# Patient Record
Sex: FEMALE | Race: WHITE | NOT HISPANIC OR LATINO | Employment: FULL TIME | URBAN - METROPOLITAN AREA
[De-identification: names, ages, dates, MRNs, and addresses within clinical notes are randomized per-mention and may not be internally consistent; named-entity substitution may affect disease eponyms.]

---

## 2018-05-07 ENCOUNTER — OFFICE VISIT (OUTPATIENT)
Dept: FAMILY MEDICINE CLINIC | Facility: CLINIC | Age: 58
End: 2018-05-07

## 2018-05-07 VITALS
HEIGHT: 63 IN | BODY MASS INDEX: 28.17 KG/M2 | HEART RATE: 72 BPM | OXYGEN SATURATION: 96 % | WEIGHT: 159 LBS | DIASTOLIC BLOOD PRESSURE: 90 MMHG | SYSTOLIC BLOOD PRESSURE: 140 MMHG | TEMPERATURE: 98.2 F

## 2018-05-07 DIAGNOSIS — IMO0002 CHRONIC MIGRAINE: ICD-10-CM

## 2018-05-07 DIAGNOSIS — F32.4 MAJOR DEPRESSIVE DISORDER WITH SINGLE EPISODE, IN PARTIAL REMISSION (HCC): ICD-10-CM

## 2018-05-07 DIAGNOSIS — G43.109 MIGRAINE WITH AURA AND WITHOUT STATUS MIGRAINOSUS, NOT INTRACTABLE: Primary | ICD-10-CM

## 2018-05-07 DIAGNOSIS — M19.90 OSTEOARTHRITIS, UNSPECIFIED OSTEOARTHRITIS TYPE, UNSPECIFIED SITE: ICD-10-CM

## 2018-05-07 DIAGNOSIS — Z12.39 BREAST CANCER SCREENING: ICD-10-CM

## 2018-05-07 PROBLEM — R20.2 PARESTHESIAS: Status: ACTIVE | Noted: 2017-05-16

## 2018-05-07 PROBLEM — M54.12 CERVICAL RADICULOPATHY: Status: ACTIVE | Noted: 2017-10-05

## 2018-05-07 RX ORDER — VENLAFAXINE HYDROCHLORIDE 75 MG/1
CAPSULE, EXTENDED RELEASE ORAL
COMMUNITY
Start: 2018-02-15 | End: 2018-05-22 | Stop reason: SDUPTHER

## 2018-05-07 RX ORDER — GABAPENTIN 600 MG/1
1 TABLET ORAL
COMMUNITY
Start: 2017-10-20 | End: 2018-10-19

## 2018-05-07 RX ORDER — ATENOLOL 25 MG/1
TABLET ORAL
COMMUNITY
Start: 2017-12-11

## 2018-05-07 RX ORDER — TRAMADOL HYDROCHLORIDE 50 MG/1
50 TABLET ORAL EVERY 6 HOURS PRN
Qty: 90 TABLET | Refills: 0 | Status: SHIPPED | OUTPATIENT
Start: 2018-05-07 | End: 2018-05-29 | Stop reason: ALTCHOICE

## 2018-05-07 RX ORDER — TRAMADOL HYDROCHLORIDE 50 MG/1
50 TABLET ORAL EVERY 6 HOURS PRN
COMMUNITY
End: 2018-05-07 | Stop reason: SDUPTHER

## 2018-05-07 RX ORDER — TIZANIDINE HYDROCHLORIDE 4 MG/1
4 CAPSULE, GELATIN COATED ORAL 3 TIMES DAILY PRN
Qty: 90 CAPSULE | Refills: 0 | Status: SHIPPED | OUTPATIENT
Start: 2018-05-07 | End: 2018-10-19

## 2018-05-07 NOTE — PROGRESS NOTES
Assessment/Plan:  Recommend follow-up with neurologist   Franky Ramirez return to office for recheck in 6 months  Recommend annual blood testing  Script given  Also, recommend follow-up with psychiatrist or psychologist as needed  She will return to office with any worsening headaches in frequency or severity  We discussed use of prophylactic migraine medication as well  No problem-specific Assessment & Plan notes found for this encounter  Diagnoses and all orders for this visit:    Migraine with aura and without status migrainosus, not intractable  -     TiZANidine (ZANAFLEX) 4 MG capsule; Take 1 capsule (4 mg total) by mouth 3 (three) times a day as needed for muscle spasms  -     Ambulatory referral to Neurology; Future  -     CBC and differential  -     Comprehensive metabolic panel  -     TSH, 3rd generation with T4 reflex; Future  -     Lipid Panel with Direct LDL reflex    Chronic migraine  -     traMADol (ULTRAM) 50 mg tablet; Take 1 tablet (50 mg total) by mouth every 6 (six) hours as needed for moderate pain    Osteoarthritis, unspecified osteoarthritis type, unspecified site  -     CBC and differential  -     Comprehensive metabolic panel  -     TSH, 3rd generation with T4 reflex; Future  -     Lipid Panel with Direct LDL reflex    Breast cancer screening  -     Mammo screening bilateral w 3d & cad; Future    Major depressive disorder with single episode, in partial remission ([facility] [address] )    Other orders  -     venlafaxine (EFFEXOR-XR) 75 mg 24 hr capsule; take 1 capsule by mouth once daily for 3 days then 2 for 3 days then 3 daily  -     gabapentin (NEURONTIN) 600 MG tablet; Take 1 tablet by mouth  -     atenolol (TENORMIN) 25 mg tablet; take 1 tablet by mouth once daily  -     Discontinue: traMADol (ULTRAM) 50 mg tablet; Take 50 mg by mouth every 6 (six) hours as needed for moderate pain          Subjective:      Patient ID: Lorri Graves is a 65 y o  female  Patient here with multiple concerns    She has history of chronic intermittent migraines  These have been present for several years  She has approximately 3-9 per month  Migraines come and go  She has occasional photophobia and phonophobia with headaches  She notes occasional nausea as well  No preceding auras  She has history of situational depression and major depressive disorder  She has seen a therapist for this in the past and is currently on medication  Symptoms are currently stable  She has history mild-to-moderate osteoarthritis symptoms that are well controlled with anti-inflammatories when needed  She is up-to-date on colonoscopy and sees a gynecologist regularly  The following portions of the patient's history were reviewed and updated as appropriate: allergies, current medications, past family history, past medical history, past social history, past surgical history and problem list     Review of Systems   Constitutional: Negative  HENT: Negative  Eyes: Negative  Respiratory: Negative  Cardiovascular: Negative  Gastrointestinal: Negative  Endocrine: Negative  Genitourinary: Negative  Musculoskeletal: Positive for neck pain  Skin: Negative  Allergic/Immunologic: Negative  Neurological: Positive for headaches  Hematological: Negative  Psychiatric/Behavioral: Negative  Objective:      /90 (BP Location: Left arm, Patient Position: Sitting, Cuff Size: Adult)   Pulse 72   Temp 98 2 °F (36 8 °C)   Ht 5' 2 6" (1 59 m)   Wt 72 1 kg (159 lb)   SpO2 96%   BMI 28 53 kg/m²          Physical Exam   Constitutional: She is oriented to person, place, and time  She appears well-developed and well-nourished  HENT:   Head: Normocephalic and atraumatic  Right Ear: External ear normal  Tympanic membrane is not erythematous and not bulging  Left Ear: External ear normal  Tympanic membrane is not erythematous and not bulging     Nose: Nose normal    Mouth/Throat: Oropharynx is clear and moist and mucous membranes are normal  No oral lesions  No oropharyngeal exudate  Eyes: Conjunctivae and EOM are normal  Right eye exhibits no discharge  Left eye exhibits no discharge  No scleral icterus  Neck: Normal range of motion  Neck supple  No thyromegaly present  Cardiovascular: Normal rate, regular rhythm and normal heart sounds  Exam reveals no gallop and no friction rub  No murmur heard  Pulmonary/Chest: Effort normal  No respiratory distress  She has no wheezes  She has no rales  She exhibits no tenderness  Abdominal: Soft  Bowel sounds are normal  She exhibits no distension and no mass  There is no tenderness  There is no rebound and no guarding  Musculoskeletal: Normal range of motion  She exhibits no edema, tenderness or deformity  Lymphadenopathy:     She has no cervical adenopathy  Neurological: She is alert and oriented to person, place, and time  She has normal reflexes  No cranial nerve deficit  She exhibits normal muscle tone  Coordination normal    Skin: Skin is warm and dry  No rash noted  No erythema  No pallor  Psychiatric: She has a normal mood and affect  Her behavior is normal    Vitals reviewed

## 2018-05-17 ENCOUNTER — OFFICE VISIT (OUTPATIENT)
Dept: FAMILY MEDICINE CLINIC | Facility: CLINIC | Age: 58
End: 2018-05-17
Payer: OTHER GOVERNMENT

## 2018-05-17 VITALS
SYSTOLIC BLOOD PRESSURE: 144 MMHG | WEIGHT: 159 LBS | BODY MASS INDEX: 28.17 KG/M2 | DIASTOLIC BLOOD PRESSURE: 90 MMHG | TEMPERATURE: 98.4 F | HEIGHT: 63 IN

## 2018-05-17 DIAGNOSIS — Z02.89 ENCOUNTER FOR PHYSICAL EXAMINATION RELATED TO EMPLOYMENT: Primary | ICD-10-CM

## 2018-05-17 DIAGNOSIS — IMO0002 CHRONIC MIGRAINE: ICD-10-CM

## 2018-05-17 DIAGNOSIS — G43.109 MIGRAINE WITH AURA AND WITHOUT STATUS MIGRAINOSUS, NOT INTRACTABLE: ICD-10-CM

## 2018-05-17 PROCEDURE — 99214 OFFICE O/P EST MOD 30 MIN: CPT | Performed by: FAMILY MEDICINE

## 2018-05-17 RX ORDER — OXYCODONE HYDROCHLORIDE AND ACETAMINOPHEN 5; 325 MG/1; MG/1
1 TABLET ORAL EVERY 6 HOURS PRN
Qty: 20 TABLET | Refills: 0 | Status: SHIPPED | OUTPATIENT
Start: 2018-05-17 | End: 2018-05-29 | Stop reason: ALTCHOICE

## 2018-05-17 NOTE — PROGRESS NOTES
Assessment/Plan:  No significant physical findings on exam today  Recommend place PPD testing at her convenience and have it checked in 2-3 days  Form completed for  facility  See chart copy  We discussed treatment options for headaches  I gave her a very short course of Percocet to use as needed but we discussed that this should not be used as a long-term treatment option  I do recommend she follow up with her neurologist regarding migraines  No problem-specific Assessment & Plan notes found for this encounter  Diagnoses and all orders for this visit:    Encounter for physical examination related to employment    Migraine with aura and without status migrainosus, not intractable    Chronic migraine  -     oxyCODONE-acetaminophen (PERCOCET) 5-325 mg per tablet; Take 1 tablet by mouth every 6 (six) hours as needed for moderate pain (headache) Max Daily Amount: 4 tablets          Subjective:      Patient ID: Kristina Arreguin is a 65 y o  female  Patient is here for a physical for a new job at a  facility  She states she continues to have headaches  She is due to see neurologist but not until October  She is currently taking a muscle relaxant and as well as tramadol at night and still has occasional breakthrough headaches  She is requesting Percocet for headaches  She states she has allergies to nonsteroidal anti-inflammatory medications  She needs PPD skin test placed but she will not be able to come back in 2-3 days to have this read  Headache          The following portions of the patient's history were reviewed and updated as appropriate: allergies, current medications, past family history, past medical history, past social history, past surgical history and problem list     Review of Systems   Constitutional: Negative  HENT: Negative  Eyes: Negative  Respiratory: Negative  Cardiovascular: Negative  Gastrointestinal: Negative  Endocrine: Negative  Genitourinary: Negative  Musculoskeletal: Negative  Skin: Negative  Allergic/Immunologic: Negative  Neurological: Positive for headaches  Hematological: Negative  Psychiatric/Behavioral: Negative  Objective:      /90   Temp 98 4 °F (36 9 °C)   Ht 5' 2 6" (1 59 m)   Wt 72 1 kg (159 lb) Comment: pt would like to use weight from the last visit on 05/07/18  BMI 28 53 kg/m²          Physical Exam   Constitutional: She is oriented to person, place, and time  She appears well-developed and well-nourished  HENT:   Head: Normocephalic and atraumatic  Right Ear: External ear normal  Tympanic membrane is not erythematous and not bulging  Left Ear: External ear normal  Tympanic membrane is not erythematous and not bulging  Nose: Nose normal    Mouth/Throat: Oropharynx is clear and moist and mucous membranes are normal  No oral lesions  No oropharyngeal exudate  Eyes: Conjunctivae and EOM are normal  Right eye exhibits no discharge  Left eye exhibits no discharge  No scleral icterus  Neck: Normal range of motion  Neck supple  No thyromegaly present  Cardiovascular: Normal rate, regular rhythm and normal heart sounds  Exam reveals no gallop and no friction rub  No murmur heard  Pulmonary/Chest: Effort normal  No respiratory distress  She has no wheezes  She has no rales  She exhibits no tenderness  Abdominal: Soft  Bowel sounds are normal  She exhibits no distension and no mass  There is no tenderness  There is no rebound and no guarding  Musculoskeletal: Normal range of motion  She exhibits no edema, tenderness or deformity  Lymphadenopathy:     She has no cervical adenopathy  Neurological: She is alert and oriented to person, place, and time  She has normal reflexes  No cranial nerve deficit  She exhibits normal muscle tone  Coordination normal    Skin: Skin is warm and dry  No rash noted  No erythema  No pallor  Psychiatric: She has a normal mood and affect  Her behavior is normal    Vitals reviewed

## 2018-05-21 ENCOUNTER — CLINICAL SUPPORT (OUTPATIENT)
Dept: FAMILY MEDICINE CLINIC | Facility: CLINIC | Age: 58
End: 2018-05-21
Payer: OTHER GOVERNMENT

## 2018-05-21 DIAGNOSIS — Z11.1 ENCOUNTER FOR PPD TEST: Primary | ICD-10-CM

## 2018-05-21 DIAGNOSIS — IMO0002 CHRONIC MIGRAINE: ICD-10-CM

## 2018-05-21 PROCEDURE — 86580 TB INTRADERMAL TEST: CPT

## 2018-05-22 DIAGNOSIS — F32.A DEPRESSION, UNSPECIFIED DEPRESSION TYPE: Primary | ICD-10-CM

## 2018-05-22 RX ORDER — OXYCODONE HYDROCHLORIDE AND ACETAMINOPHEN 5; 325 MG/1; MG/1
1 TABLET ORAL EVERY 6 HOURS PRN
Qty: 20 TABLET | Refills: 0 | OUTPATIENT
Start: 2018-05-22

## 2018-05-23 ENCOUNTER — OFFICE VISIT (OUTPATIENT)
Dept: FAMILY MEDICINE CLINIC | Facility: CLINIC | Age: 58
End: 2018-05-23
Payer: OTHER GOVERNMENT

## 2018-05-23 ENCOUNTER — TELEPHONE (OUTPATIENT)
Dept: PAIN MEDICINE | Facility: MEDICAL CENTER | Age: 58
End: 2018-05-23

## 2018-05-23 VITALS
WEIGHT: 159 LBS | TEMPERATURE: 97.7 F | BODY MASS INDEX: 28.53 KG/M2 | SYSTOLIC BLOOD PRESSURE: 140 MMHG | DIASTOLIC BLOOD PRESSURE: 86 MMHG

## 2018-05-23 DIAGNOSIS — IMO0002 CHRONIC MIGRAINE: Primary | ICD-10-CM

## 2018-05-23 DIAGNOSIS — F32.A DEPRESSION, UNSPECIFIED DEPRESSION TYPE: ICD-10-CM

## 2018-05-23 DIAGNOSIS — F41.9 ANXIETY: ICD-10-CM

## 2018-05-23 PROCEDURE — 99214 OFFICE O/P EST MOD 30 MIN: CPT | Performed by: FAMILY MEDICINE

## 2018-05-23 RX ORDER — VENLAFAXINE HYDROCHLORIDE 75 MG/1
75 CAPSULE, EXTENDED RELEASE ORAL DAILY
Qty: 90 CAPSULE | Refills: 0 | Status: SHIPPED | OUTPATIENT
Start: 2018-05-23 | End: 2018-05-23 | Stop reason: SDUPTHER

## 2018-05-23 RX ORDER — VENLAFAXINE HYDROCHLORIDE 75 MG/1
75 CAPSULE, EXTENDED RELEASE ORAL DAILY
Qty: 90 CAPSULE | Refills: 0 | Status: SHIPPED | OUTPATIENT
Start: 2018-05-23

## 2018-05-23 RX ORDER — RIZATRIPTAN BENZOATE 10 MG/1
TABLET, ORALLY DISINTEGRATING ORAL
Qty: 9 TABLET | Refills: 2 | Status: SHIPPED | OUTPATIENT
Start: 2018-05-23 | End: 2018-10-19 | Stop reason: ALTCHOICE

## 2018-05-23 NOTE — ASSESSMENT & PLAN NOTE
No significant findings on physical exam today  We spent significant amount of time discussing her headaches and the possibility of being dependent on her current pain medication  I did recommend consideration for physical therapy or chiropractic treatment for the headache  These were deferred  We also discussed prophylactic treatment including amitriptyline  This was deferred by patient as well  Recommended consideration for prednisone but this was also refused  We will try Maxalt as needed for headaches  She does have a follow-up appointment with headache specialist in October  I did give her the card for a pain management specialist further consultation  Patient was informed we will not be providing any further prescriptions for narcotic pain medication as she is possibly becoming dependent on this and she has deferred other recommendations that I think might be better options for the headaches

## 2018-05-23 NOTE — PROGRESS NOTES
Assessment/Plan:    Chronic migraine  No significant findings on physical exam today  We spent significant amount of time discussing her headaches and the possibility of being dependent on her current pain medication  I did recommend consideration for physical therapy or chiropractic treatment for the headache  These were deferred  We also discussed prophylactic treatment including amitriptyline  This was deferred by patient as well  Recommended consideration for prednisone but this was also refused  We will try Maxalt as needed for headaches  She does have a follow-up appointment with headache specialist in October  I did give her the card for a pain management specialist further consultation  Patient was informed we will not be providing any further prescriptions for narcotic pain medication as she is possibly becoming dependent on this and she has deferred other recommendations that I think might be better options for the headaches  Major depressive disorder with single episode, in partial remission ([facility] )  Refill on Effexor was given today  Her anxiety and depression symptoms may be playing a role in chronic headaches as well  She has refused much of the recommendations for headache treatment  She is seeking follow-up with a therapist   I do agree with pursuit of this treatment option  Diagnoses and all orders for this visit:    Chronic migraine  -     rizatriptan (MAXALT-MLT) 10 MG disintegrating tablet; Take 1 tablet daily as needed for headache  May repeat one dose in 1 hour if no improvement  Anxiety    Depression, unspecified depression type  -     venlafaxine (EFFEXOR-XR) 75 mg 24 hr capsule; Take 1 capsule (75 mg total) by mouth daily          Subjective:      Patient ID: Alma Heck is a 65 y o  female  Patient is here for recheck on headaches  She had gone through Percocet rather quickly and was requesting a refill    She had used 20 Percocet in the course of a few days   She states she has been taking tramadol 2 tablets every 6 hours as needed for headaches  She continues to have frequent headaches  A she is requesting refill on Percocet again today  Patient states that headaches seem to come up the back of the neck and around to the bilateral frontal aspects of the head  She is on a beta-blocker currently and does take medication for anxiety  She admits to continued frequent crying spells and has some significant stressors at home including a child who has history of addiction  The following portions of the patient's history were reviewed and updated as appropriate: allergies, current medications, past family history, past medical history, past social history, past surgical history and problem list     Review of Systems   Constitutional: Negative  HENT: Negative  Eyes: Negative  Respiratory: Negative  Cardiovascular: Negative  Gastrointestinal: Negative  Endocrine: Negative  Genitourinary: Negative  Musculoskeletal: Negative  Skin: Negative  Allergic/Immunologic: Negative  Neurological: Positive for headaches  Hematological: Negative  Psychiatric/Behavioral: Positive for dysphoric mood  Negative for agitation, hallucinations, self-injury, sleep disturbance and suicidal ideas  The patient is nervous/anxious  Objective:      /86   Temp 97 7 °F (36 5 °C)   Wt 72 1 kg (159 lb)   BMI 28 53 kg/m²          Physical Exam   Constitutional: She is oriented to person, place, and time  She appears well-developed and well-nourished  HENT:   Head: Normocephalic and atraumatic  Right Ear: External ear normal  Tympanic membrane is not erythematous and not bulging  Left Ear: External ear normal  Tympanic membrane is not erythematous and not bulging  Nose: Nose normal    Mouth/Throat: Oropharynx is clear and moist and mucous membranes are normal  No oral lesions  No oropharyngeal exudate     Eyes: Conjunctivae and EOM are normal  Right eye exhibits no discharge  Left eye exhibits no discharge  No scleral icterus  Neck: Normal range of motion  Neck supple  No thyromegaly present  Cardiovascular: Normal rate, regular rhythm and normal heart sounds  Exam reveals no gallop and no friction rub  No murmur heard  Pulmonary/Chest: Effort normal  No respiratory distress  She has no wheezes  She has no rales  She exhibits no tenderness  Abdominal: Soft  Bowel sounds are normal  She exhibits no distension and no mass  There is no tenderness  There is no rebound and no guarding  Musculoskeletal: Normal range of motion  She exhibits no edema, tenderness or deformity  Lymphadenopathy:     She has no cervical adenopathy  Neurological: She is alert and oriented to person, place, and time  She has normal reflexes  No cranial nerve deficit  She exhibits normal muscle tone  Coordination normal    Skin: Skin is warm and dry  No rash noted  No erythema  No pallor  Psychiatric: She has a normal mood and affect  Her behavior is normal    Vitals reviewed

## 2018-05-23 NOTE — ASSESSMENT & PLAN NOTE
Refill on Effexor was given today  Her anxiety and depression symptoms may be playing a role in chronic headaches as well  She has refused much of the recommendations for headache treatment  She is seeking follow-up with a therapist   I do agree with pursuit of this treatment option

## 2018-05-29 ENCOUNTER — TELEPHONE (OUTPATIENT)
Dept: FAMILY MEDICINE CLINIC | Facility: CLINIC | Age: 58
End: 2018-05-29

## 2018-05-29 DIAGNOSIS — G43.909 MIGRAINE WITHOUT STATUS MIGRAINOSUS, NOT INTRACTABLE, UNSPECIFIED MIGRAINE TYPE: Primary | ICD-10-CM

## 2018-05-29 RX ORDER — PREDNISONE 10 MG/1
TABLET ORAL
Qty: 33 TABLET | Refills: 0 | Status: SHIPPED | OUTPATIENT
Start: 2018-05-29 | End: 2018-10-19

## 2018-05-29 NOTE — TELEPHONE ENCOUNTER
Pt called stating that she called [facility] and was told that before she can make an appt she needs a referral/order from you  They also stated that it would be several weeks before she could be seen over there after the get the referral/order  She is asking if we would refill her tramadol until she can be seen over there  Please advise  Thank you

## 2018-05-30 ENCOUNTER — TELEPHONE (OUTPATIENT)
Dept: PAIN MEDICINE | Facility: MEDICAL CENTER | Age: 58
End: 2018-05-30

## 2018-05-30 NOTE — TELEPHONE ENCOUNTER
Patient states in her previous visit it was discussed that prednisone did not work for her, She states that she want to be prescribed tramadol please advise?

## 2018-05-31 NOTE — TELEPHONE ENCOUNTER
I am recommending prednisone  It is up to her if she would like to follow our recommendation or not

## 2018-06-08 ENCOUNTER — TELEPHONE (OUTPATIENT)
Dept: FAMILY MEDICINE CLINIC | Facility: CLINIC | Age: 58
End: 2018-06-08

## 2018-06-08 NOTE — TELEPHONE ENCOUNTER
Patient called and then dropped off letter from [facility], put on your desk, not sure what medication she is requesting  Please advise  Thank you!

## 2018-06-12 RX ORDER — TRAMADOL HYDROCHLORIDE 50 MG/1
50 TABLET ORAL EVERY 6 HOURS
Qty: 30 TABLET | Refills: 0 | OUTPATIENT
Start: 2018-06-12

## 2018-06-12 RX ORDER — TRAMADOL HYDROCHLORIDE 50 MG/1
50 TABLET ORAL EVERY 6 HOURS
Refills: 0 | COMMUNITY
Start: 2018-06-03 | End: 2018-10-19

## 2018-06-12 NOTE — TELEPHONE ENCOUNTER
I read the letter  I do not see a request for medications  I did sign off the letter and it can be scanned into the chart

## 2018-06-12 NOTE — TELEPHONE ENCOUNTER
Pt is requesting a refill on tramadol  [facility] there is a letter on you desk from them  They did not give her any medication at that time and stated she should follow up with you for medication  She will be seen by them again on 6/22/2018      Please advise

## 2018-06-25 ENCOUNTER — TELEPHONE (OUTPATIENT)
Dept: FAMILY MEDICINE CLINIC | Facility: CLINIC | Age: 58
End: 2018-06-25

## 2018-06-25 NOTE — TELEPHONE ENCOUNTER
Received signed medical records release to send records from 06/18/18 to current for transfer of care to [facility]  Faxed to [phone] for processing 06/25/2018

## 2018-09-17 ENCOUNTER — TELEPHONE (OUTPATIENT)
Dept: NEUROLOGY | Facility: CLINIC | Age: 58
End: 2018-09-17

## 2018-10-15 RX ORDER — VENLAFAXINE 75 MG/1
75 TABLET ORAL 3 TIMES DAILY
Refills: 0 | COMMUNITY
Start: 2018-08-03 | End: 2018-10-19

## 2018-10-15 RX ORDER — LEVOTHYROXINE SODIUM 0.03 MG/1
25 TABLET ORAL DAILY
Refills: 0 | COMMUNITY
Start: 2018-08-20

## 2018-10-15 RX ORDER — OMEPRAZOLE 10 MG/1
CAPSULE, DELAYED RELEASE ORAL
COMMUNITY
End: 2018-10-19

## 2018-10-15 RX ORDER — GABAPENTIN 600 MG
600 TABLET ORAL 3 TIMES DAILY
COMMUNITY

## 2018-10-15 RX ORDER — LORAZEPAM 0.5 MG/1
TABLET ORAL
Refills: 0 | COMMUNITY
Start: 2018-08-27 | End: 2018-10-19

## 2018-10-15 RX ORDER — OXYCODONE HYDROCHLORIDE AND ACETAMINOPHEN 5; 325 MG/1; MG/1
TABLET ORAL
Refills: 0 | COMMUNITY
Start: 2018-09-13 | End: 2018-10-19

## 2018-10-19 ENCOUNTER — APPOINTMENT (OUTPATIENT)
Dept: LAB | Facility: MEDICAL CENTER | Age: 58
End: 2018-10-19
Payer: OTHER GOVERNMENT

## 2018-10-19 ENCOUNTER — OFFICE VISIT (OUTPATIENT)
Dept: NEUROLOGY | Facility: CLINIC | Age: 58
End: 2018-10-19
Payer: OTHER GOVERNMENT

## 2018-10-19 VITALS
HEIGHT: 63 IN | DIASTOLIC BLOOD PRESSURE: 92 MMHG | BODY MASS INDEX: 27.64 KG/M2 | WEIGHT: 156 LBS | HEART RATE: 75 BPM | SYSTOLIC BLOOD PRESSURE: 123 MMHG

## 2018-10-19 DIAGNOSIS — G43.709 CHRONIC MIGRAINE WITHOUT AURA WITHOUT STATUS MIGRAINOSUS, NOT INTRACTABLE: ICD-10-CM

## 2018-10-19 DIAGNOSIS — F32.4 MAJOR DEPRESSIVE DISORDER WITH SINGLE EPISODE, IN PARTIAL REMISSION (HCC): Primary | ICD-10-CM

## 2018-10-19 DIAGNOSIS — E55.9 VITAMIN D DEFICIENCY: ICD-10-CM

## 2018-10-19 DIAGNOSIS — M54.2 CERVICALGIA: ICD-10-CM

## 2018-10-19 DIAGNOSIS — G43.109 MIGRAINE WITH AURA AND WITHOUT STATUS MIGRAINOSUS, NOT INTRACTABLE: ICD-10-CM

## 2018-10-19 LAB
25(OH)D3 SERPL-MCNC: 12.1 NG/ML (ref 30–100)
BUN SERPL-MCNC: 6 MG/DL (ref 5–25)
CREAT SERPL-MCNC: 0.84 MG/DL (ref 0.6–1.3)
GFR SERPL CREATININE-BSD FRML MDRD: 77 ML/MIN/1.73SQ M
HCYS SERPL-SCNC: 18.7 UMOL/L (ref 3.7–11.2)
VIT B12 SERPL-MCNC: 186 PG/ML (ref 100–900)

## 2018-10-19 PROCEDURE — 83090 ASSAY OF HOMOCYSTEINE: CPT

## 2018-10-19 PROCEDURE — 99244 OFF/OP CNSLTJ NEW/EST MOD 40: CPT | Performed by: PSYCHIATRY & NEUROLOGY

## 2018-10-19 PROCEDURE — 82607 VITAMIN B-12: CPT

## 2018-10-19 PROCEDURE — 82565 ASSAY OF CREATININE: CPT

## 2018-10-19 PROCEDURE — 84520 ASSAY OF UREA NITROGEN: CPT

## 2018-10-19 PROCEDURE — 82306 VITAMIN D 25 HYDROXY: CPT

## 2018-10-19 PROCEDURE — 36415 COLL VENOUS BLD VENIPUNCTURE: CPT

## 2018-10-19 RX ORDER — DEXAMETHASONE 2 MG/1
TABLET ORAL
Qty: 5 TABLET | Refills: 0 | Status: SHIPPED | OUTPATIENT
Start: 2018-10-19

## 2018-10-19 RX ORDER — PROCHLORPERAZINE MALEATE 10 MG
TABLET ORAL
Qty: 15 TABLET | Refills: 0 | Status: SHIPPED | OUTPATIENT
Start: 2018-10-19

## 2018-10-19 RX ORDER — OLANZAPINE 5 MG/1
TABLET ORAL
Qty: 30 TABLET | Refills: 3 | Status: SHIPPED | OUTPATIENT
Start: 2018-10-19

## 2018-10-19 RX ORDER — METOCLOPRAMIDE 10 MG/1
TABLET ORAL
Refills: 0 | COMMUNITY
Start: 2018-10-16

## 2018-10-19 RX ORDER — TIZANIDINE 2 MG/1
TABLET ORAL
Qty: 90 TABLET | Refills: 3 | Status: SHIPPED | OUTPATIENT
Start: 2018-10-19

## 2018-10-19 NOTE — PATIENT INSTRUCTIONS
Preventive:   - will apply for Botox  - I would like for her to take olanzapine 5mg at bedtime daily  - Magnesium Oxide 500 mg a day  If any diarrhea or upset stomach, decrease dose  as tolerated  -  B2 200 mg twice a day  This supplement will change the color of the urine to fluorescent yellow no matter how hydrated, which is normal    Abortive: At the onset of a headache patient is take Compazine in 2 hours if this fails patient is to take Decadron 2 mg  Headache management instructions  - When patient has a moderate to severe headache, they should seek rest, initiate relaxation and apply cold compresses to the head  - Maintain regular sleep schedule  Adults need at least 7-8 hours of uninterrupted a night  - Limit over the counter medications such as Tylenol, Ibuprofen, Aleve, Excedrin  (No more than 3 times a week)  - Maintain headache diary  We discussed an [employer] for a smart phone is "Migraine e Diary"  - Limit caffeine to 1-2 cups a day or less  - Avoid dietary trigger  (aged cheese, peanuts, MSG, aspartame and nitrates)  - Patient is to have regular frequent meals to prevent headache onset  - Please drink at least 64 ounces of water a day to help remain hydrated  Please call with any questions or concerns
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
## 2018-10-25 ENCOUNTER — TELEPHONE (OUTPATIENT)
Dept: NEUROLOGY | Facility: CLINIC | Age: 58
End: 2018-10-25

## 2018-10-25 DIAGNOSIS — E55.9 VITAMIN D DEFICIENCY: Primary | ICD-10-CM

## 2018-10-25 RX ORDER — ERGOCALCIFEROL 1.25 MG/1
CAPSULE ORAL
Qty: 12 CAPSULE | Refills: 0 | Status: SHIPPED | OUTPATIENT
Start: 2018-10-25 | End: 2018-11-07 | Stop reason: SDUPTHER

## 2018-10-25 NOTE — TELEPHONE ENCOUNTER
Contacted patient at this time  Phone call answered and then got disconnected    Second call placed and left a message on pt's answering machine for a call back

## 2018-10-25 NOTE — TELEPHONE ENCOUNTER
----- Message from Clayton Marcos MD sent at 10/25/2018 10:25 AM EDT -----  Please call the patient regarding her abnormal result  Please call patient and tell her that it is very important that she take her B12 1000 mcg on daily basis  Also should get B12 injections on a weekly basis for at least 1 month  She should also take vitamin D 3 5000 international units on daily basis  I will also send out vitamin D3 50,000 international units to take on weekly basis for 12 weeks  Her homocystine is very high does patient should also take folic acid 487 mg per day     Thank you    Homocyst(e)ine, P/S 3 7 - 11 2 umol/L 18 7       Vit D, 25-Hydroxy 30 0 - 100 0 ng/mL 12 1

## 2018-10-25 NOTE — TELEPHONE ENCOUNTER
Called patient and left a message to call back  Need to schedule Botox appointment, I have 11/08/18 at 9:00am at our  on hold for her

## 2018-10-30 NOTE — TELEPHONE ENCOUNTER
Called patient for a third time and left a message to call back  I have 11/08/18 at 9:00am on hold for New Start Botox appointment

## 2018-10-31 NOTE — TELEPHONE ENCOUNTER
Called and Left a message on pt's answering machine for a call back  Third attempt to connect with patient, letter sent to patient requesting call back at this time

## 2018-11-01 NOTE — TELEPHONE ENCOUNTER
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

## 2018-11-03 ENCOUNTER — HOSPITAL ENCOUNTER (OUTPATIENT)
Dept: MRI IMAGING | Facility: HOSPITAL | Age: 58
Discharge: HOME | End: 2018-11-03
Attending: PSYCHIATRY & NEUROLOGY
Payer: OTHER GOVERNMENT

## 2018-11-03 DIAGNOSIS — G43.709 CHRONIC MIGRAINE WITHOUT AURA WITHOUT STATUS MIGRAINOSUS, NOT INTRACTABLE: ICD-10-CM

## 2018-11-03 DIAGNOSIS — G43.109 MIGRAINE WITH AURA AND WITHOUT STATUS MIGRAINOSUS, NOT INTRACTABLE: ICD-10-CM

## 2018-11-03 PROCEDURE — 70544 MR ANGIOGRAPHY HEAD W/O DYE: CPT

## 2018-11-03 PROCEDURE — A9585 GADOBUTROL INJECTION: HCPCS | Performed by: PSYCHIATRY & NEUROLOGY

## 2018-11-03 PROCEDURE — 70549 MR ANGIOGRAPH NECK W/O&W/DYE: CPT

## 2018-11-03 PROCEDURE — 70553 MRI BRAIN STEM W/O & W/DYE: CPT

## 2018-11-03 RX ADMIN — GADOBUTROL 7 ML: 604.72 INJECTION INTRAVENOUS at 11:21

## 2018-11-06 NOTE — TELEPHONE ENCOUNTER
Called and spoke to patient, scheduled Botox appointment for 12/19/18 at 3:30 pm at our   Will mail appointment card and direction

## 2018-11-07 ENCOUNTER — TELEPHONE (OUTPATIENT)
Dept: NEUROLOGY | Facility: CLINIC | Age: 58
End: 2018-11-07

## 2018-11-07 DIAGNOSIS — E55.9 VITAMIN D DEFICIENCY: ICD-10-CM

## 2018-11-07 RX ORDER — ERGOCALCIFEROL 1.25 MG/1
CAPSULE ORAL
Qty: 12 CAPSULE | Refills: 0 | Status: SHIPPED | OUTPATIENT
Start: 2018-11-07

## 2018-11-07 NOTE — TELEPHONE ENCOUNTER
Patient called back and stated she has been taking:    Preventative:   -Olanzapine 5mg at bedtime daily  Abortive:  Decadron and Compazine: Patient states she was not very clear on the directions since Decadron says for "headache" and Compazine says for "migraine" on her medication bottles  I did read instructions as stated below and patient confirms she has tried taking the Compazine followed by the Decadron if no relief  Patient states she did not get relief     -Has not been taking Magnesium Oxide or B2    Patient again states she just wants to go back to taking Tramadol two tablets in the morning and two tablets at night which helped previously    Please advise  Patient also requesting that Vitamin D2 prescription be sent to [facility]  She states it was previously sent to [facility] and that is out of her way  Please see Vitamin D2 order and approve if agreeable      Clinical Team: Okay to leave detailed message on voicemail per patient request

## 2018-11-07 NOTE — TELEPHONE ENCOUNTER
Preventive:   -  Will apply for Botox  - I would like for her to take olanzapine 5mg at bedtime daily  - Magnesium Oxide 500 mg a day  If any diarrhea or upset stomach, decrease dose  as tolerated  -  B2 200 mg twice a day  This supplement will change the color of the urine to fluorescent yellow no matter how hydrated, which is normal    Abortive: At the onset of a headache patient is take Compazine in 2 hours if this fails patient is to take Decadron 2 mg  Has she tried this?

## 2018-11-07 NOTE — TELEPHONE ENCOUNTER
Please review brain MRI, requesting results  Patient states she's had a headache for 3 days now and nothing is helping  She would not review with me what she is taking, states we have in on file, all the meds that have been prescribed  She states she's taken everything that was prescribed and nothing has helped  Pain level 7 or 8/10  Nausea, dizzy, light sensitivity, smells  She would like meds to take while at work and also for bed  She "wants the tramadol back"  Please send to [facility] on file

## 2018-11-07 NOTE — TELEPHONE ENCOUNTER
Vit D2 approved    We will not be prescribing the tramadol for her migraines  She needs to take the B2 and Magnesium as directed  We had applied for Botox and if approved this will help decrease her migraines

## 2018-11-12 NOTE — TELEPHONE ENCOUNTER
Meme from [facility] called stated that patient is there in tears worried about her results and requesting tramadol for her migraine  I did go over this encounter with her, that we were not planning on giving her tramadol, she asked that I fax over the results for the Doctor to review  Results were faxed

## 2018-11-20 ENCOUNTER — TELEPHONE (OUTPATIENT)
Dept: NEUROLOGY | Facility: CLINIC | Age: 58
End: 2018-11-20

## 2018-11-20 NOTE — TELEPHONE ENCOUNTER
Called and Left a message on pt's answering machine for a call back    Available appt with Dr Ramesh Barillas tomorrow at [facility]

## 2018-11-20 NOTE — TELEPHONE ENCOUNTER
----- Message from Jw Gauthier MD sent at 11/20/2018  1:58 PM EST -----  Please call the patient regarding her abnormal result  pt needs a follow up please   thx

## 2018-11-29 NOTE — TELEPHONE ENCOUNTER
Called and Left a message on pt's answering machine for a call back  Will mail home a letter to contact the office       Patient is scheduled for f/u on 12/19

## 2018-12-18 ENCOUNTER — TELEPHONE (OUTPATIENT)
Dept: NEUROLOGY | Facility: CLINIC | Age: 58
End: 2018-12-18

## 2018-12-18 NOTE — TELEPHONE ENCOUNTER
Called and spoke to patient to reschedule New Start Botox Appointment, and she stated that she was not ready to do that  I will take appointment I had saved off of hold

## 2019-11-19 ENCOUNTER — APPOINTMENT (EMERGENCY)
Dept: CT IMAGING | Facility: HOSPITAL | Age: 59
End: 2019-11-19
Payer: OTHER GOVERNMENT

## 2019-11-19 ENCOUNTER — HOSPITAL ENCOUNTER (EMERGENCY)
Facility: HOSPITAL | Age: 59
Discharge: HOME | End: 2019-11-19
Attending: EMERGENCY MEDICINE | Admitting: EMERGENCY MEDICINE
Payer: OTHER GOVERNMENT

## 2019-11-19 VITALS
WEIGHT: 165.34 LBS | SYSTOLIC BLOOD PRESSURE: 161 MMHG | RESPIRATION RATE: 20 BRPM | HEART RATE: 58 BPM | TEMPERATURE: 97.8 F | BODY MASS INDEX: 29.29 KG/M2 | OXYGEN SATURATION: 98 % | DIASTOLIC BLOOD PRESSURE: 86 MMHG

## 2019-11-19 DIAGNOSIS — K08.89 PAIN, DENTAL: ICD-10-CM

## 2019-11-19 DIAGNOSIS — S00.83XA CONTUSION OF FACE, INITIAL ENCOUNTER: Primary | ICD-10-CM

## 2019-11-19 PROCEDURE — 99284 EMERGENCY DEPT VISIT MOD MDM: CPT

## 2019-11-19 PROCEDURE — 99284 EMERGENCY DEPT VISIT MOD MDM: CPT | Performed by: PHYSICIAN ASSISTANT

## 2019-11-19 PROCEDURE — 70486 CT MAXILLOFACIAL W/O DYE: CPT

## 2019-11-19 PROCEDURE — 70450 CT HEAD/BRAIN W/O DYE: CPT

## 2019-11-19 RX ORDER — TRAMADOL HYDROCHLORIDE 50 MG/1
50 TABLET ORAL EVERY 8 HOURS PRN
Qty: 6 TABLET | Refills: 0 | Status: SHIPPED | OUTPATIENT
Start: 2019-11-19
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
## 2020-01-06 ENCOUNTER — HOSPITAL ENCOUNTER (EMERGENCY)
Facility: HOSPITAL | Age: 60
Discharge: HOME | End: 2020-01-06
Attending: EMERGENCY MEDICINE
Payer: OTHER GOVERNMENT

## 2020-01-06 VITALS
BODY MASS INDEX: 29.41 KG/M2 | DIASTOLIC BLOOD PRESSURE: 81 MMHG | WEIGHT: 166.01 LBS | OXYGEN SATURATION: 99 % | HEART RATE: 65 BPM | RESPIRATION RATE: 65 BRPM | TEMPERATURE: 98.7 F | SYSTOLIC BLOOD PRESSURE: 169 MMHG

## 2020-01-06 DIAGNOSIS — F32.A DEPRESSION: Primary | ICD-10-CM

## 2020-01-06 DIAGNOSIS — R51.9 RECURRENT HEADACHE: ICD-10-CM

## 2020-01-06 PROCEDURE — 99283 EMERGENCY DEPT VISIT LOW MDM: CPT

## 2020-01-06 PROCEDURE — 99283 EMERGENCY DEPT VISIT LOW MDM: CPT | Performed by: EMERGENCY MEDICINE

## 2020-01-06 RX ORDER — FAMOTIDINE 10 MG
10 TABLET ORAL 2 TIMES DAILY
COMMUNITY

## 2020-01-06 RX ORDER — ACETAMINOPHEN AND CODEINE PHOSPHATE 300; 30 MG/1; MG/1
1 TABLET ORAL EVERY 4 HOURS PRN
COMMUNITY

## 2020-01-06 RX ORDER — ONDANSETRON 4 MG/1
4 TABLET, ORALLY DISINTEGRATING ORAL EVERY 6 HOURS PRN
COMMUNITY

## 2020-01-07 NOTE — ED PROVIDER NOTES
History  Chief Complaint   Patient presents with    Headache - Recurrent or Known Dx Migraines     patient with hx of, episode now for several days and increasing  50-year-old female presents for evaluation of headache  Patient complains of gradual onset of a headache 1 week ago  She describes a burning sensation of starts in the base of her head at the neck and wraps around the sides   The pain is constant, waxes and wanes in intensity is currently a 7/10  She was given Tylenol threes in [place] which seemed to help her symptoms but they then ran out  She states she usually takes tramadol for headache and she is currently between doctors and unable to get it refilled  She denies focal neuro deficit w or weakness, neck pain or neck stiffness, cough UR symptoms, fevers, chills, nausea, vomiting  Patient states this is a normal headache   History provided by:  Patient  Headache - Recurrent or Known Dx Migraines   Associated symptoms: no abdominal pain, no congestion, no diarrhea, no dizziness, no ear pain, no eye pain, no fatigue, no fever, no myalgias, no nausea, no neck pain, no neck stiffness, no numbness, no seizures, no sore throat, no vomiting and no weakness        Prior to Admission Medications   Prescriptions Last Dose Informant Patient Reported? Taking?    OLANZapine (ZyPREXA) 5 mg tablet Not Taking at Unknown time  No No   Si at bedtime daily   Patient not taking: Reported on 2019   acetaminophen-codeine (TYLENOL #3) 300-30 mg per tablet   Yes Yes   Sig: Take 1 tablet by mouth every 4 (four) hours as needed for moderate pain   atenolol (TENORMIN) 25 mg tablet  Self Yes Yes   Sig: take 1 tablet by mouth once daily   dexamethasone (DECADRON) 2 mg tablet Not Taking at Unknown time  No No   Si at onset of a severe headache   Patient not taking: Reported on 2019   ergocalciferol (VITAMIN D2) 50,000 units Not Taking at Unknown time  No No   Sig: Once a week for 12 weeks Patient not taking: Reported on 2019   famotidine (PEPCID) 10 mg tablet  Self Yes Yes   Sig: Take 10 mg by mouth 2 (two) times a day   gabapentin (NEURONTIN) 600 MG tablet   Yes Yes   Sig: Take 600 mg by mouth 3 (three) times a day     levothyroxine 25 mcg tablet   Yes Yes   Sig: Take 25 mcg by mouth daily   metoclopramide (REGLAN) 10 mg tablet Not Taking at Unknown time  Yes No   Sig: take 1 tablet by mouth every 8 hours if needed nausea and vomiting   ondansetron (ZOFRAN-ODT) 4 mg disintegrating tablet  Self Yes Yes   Sig: Take 4 mg by mouth every 6 (six) hours as needed for nausea or vomiting   prochlorperazine (COMPAZINE) 10 mg tablet Not Taking at Unknown time  No No   Si tab at onset of migraine, can repeat in 8 hours, can take with triptan/NSAID   Patient not taking: Reported on 2019   tiZANidine (ZANAFLEX) 2 mg tablet   No Yes   Si mg 3 times a day p r n    traMADol (ULTRAM) 50 mg tablet   No No   Sig: Take 1 tablet (50 mg total) by mouth every 8 (eight) hours as needed for moderate pain   venlafaxine (EFFEXOR-XR) 75 mg 24 hr capsule   No Yes   Sig: Take 1 capsule (75 mg total) by mouth daily      Facility-Administered Medications: None       Past Medical History:   Diagnosis Date    Migraine        Past Surgical History:   Procedure Laterality Date    HYSTERECTOMY      partial       History reviewed  No pertinent family history  I have reviewed and agree with the history as documented  Social History     Tobacco Use    Smoking status: Never Smoker    Smokeless tobacco: Never Used   Substance Use Topics    Alcohol use: Yes     Comment: occasional    Drug use: No        Review of Systems   Constitutional: Negative for activity change, appetite change, fatigue and fever  HENT: Negative for congestion, dental problem, ear pain, rhinorrhea and sore throat  Eyes: Negative for pain and redness  Respiratory: Negative for chest tightness, shortness of breath and wheezing  Cardiovascular: Negative for chest pain and palpitations  Gastrointestinal: Negative for abdominal pain, blood in stool, constipation, diarrhea, nausea and vomiting  Endocrine: Negative for cold intolerance and heat intolerance  Genitourinary: Negative for dysuria, frequency and hematuria  Musculoskeletal: Negative for arthralgias, myalgias, neck pain and neck stiffness  Skin: Negative for color change, pallor and rash  Neurological: Positive for headaches  Negative for dizziness, tremors, seizures, syncope, facial asymmetry, speech difficulty, weakness, light-headedness and numbness  Hematological: Does not bruise/bleed easily  Psychiatric/Behavioral: Negative for agitation, hallucinations and suicidal ideas  Physical Exam  Physical Exam   Constitutional: She is oriented to person, place, and time  She appears well-developed and well-nourished  HENT:   Mouth/Throat: No oropharyngeal exudate  TMs normal bilaterally no pharyngeal erythema no rhinorrhea nontender palpation of sinuses, normal looking turbinates   Eyes: Pupils are equal, round, and reactive to light  Conjunctivae and EOM are normal    Normal funduscopic exam    Neck: Normal range of motion  Neck supple  No meningeal signs   Cardiovascular: Normal rate, regular rhythm, normal heart sounds and intact distal pulses  Pulmonary/Chest: Effort normal and breath sounds normal  No respiratory distress  She has no wheezes  She has no rales  She exhibits no tenderness  Abdominal: Soft  Bowel sounds are normal  She exhibits no distension and no mass  There is no tenderness  No hernia  No cvat   Musculoskeletal: Normal range of motion  She exhibits no edema  Lymphadenopathy:     She has no cervical adenopathy  Neurological: She is alert and oriented to person, place, and time  No cranial nerve deficit  Normal strength/sensation throughout, normal gait, normal mental status  Skin: No rash noted  No erythema     No edema Psychiatric: She has a normal mood and affect  Her behavior is normal    Nursing note and vitals reviewed  Vital Signs  ED Triage Vitals   Temperature Pulse Respirations Blood Pressure SpO2   01/06/20 1836 01/06/20 1833 01/06/20 1833 01/06/20 1833 01/06/20 1833   98 7 °F (37 1 °C) 65 (!) 65 169/81 99 %      Temp Source Heart Rate Source Patient Position - Orthostatic VS BP Location FiO2 (%)   01/06/20 1836 01/06/20 1833 01/06/20 1833 01/06/20 1833 --   Oral Monitor Sitting Right arm       Pain Score       01/06/20 1833       Worst Possible Pain           Vitals:    01/06/20 1833   BP: 169/81   Pulse: 65   Patient Position - Orthostatic VS: Sitting         Visual Acuity      ED Medications  Medications - No data to display    Diagnostic Studies  Results Reviewed     None                 No orders to display              Procedures  Procedures         ED Course                               MDM  Number of Diagnoses or Management Options  Depression:   Recurrent headache:   Diagnosis management comments: Recurrent headache with absence of red flag signs or symptoms with benign exam that is consistent with her headache pattern  Patient was offered treatment here and given options for medications to take at home  Patient said Amee argueta not give me tramadol all just go home then  Patient left without being treated in the emergency department and without prescriptions for home despite being offered treatment again          Disposition  Final diagnoses:   Recurrent headache   Depression     Time reflects when diagnosis was documented in both MDM as applicable and the Disposition within this note     Time User Action Codes Description Comment    1/6/2020  7:38 PM Griselda Emms J Add [R51] Recurrent headache     1/6/2020  7:40 PM Davide López Add [F32 9] Depression     1/6/2020  7:40 PM Davide Sarna Modify [R51] Recurrent headache     1/6/2020  7:40 PM Arnold Rene Modify [F32 9] Depression       ED Disposition     ED Disposition Condition Date/Time Comment    Discharge Stable Mon Jan 6, 2020  7:38 PM Massimo Oliveros discharge to home/self care  Follow-up Information     Follow up With Specialties Details Why Contact Info    Addis Dodge, DO Family Medicine Schedule an appointment as soon as possible for a visit   [address] [place] [address] [phone]            Patient's Medications   Discharge Prescriptions    No medications on file     No discharge procedures on file      ED Provider  Electronically Signed by           Mynor De Anda MD  01/06/20 8202

## 2021-06-06 ENCOUNTER — APPOINTMENT (OUTPATIENT)
Dept: RADIOLOGY | Facility: CLINIC | Age: 61
End: 2021-06-06
Payer: OTHER GOVERNMENT

## 2021-06-06 ENCOUNTER — OFFICE VISIT (OUTPATIENT)
Dept: URGENT CARE | Facility: CLINIC | Age: 61
End: 2021-06-06
Payer: OTHER GOVERNMENT

## 2021-06-06 VITALS
SYSTOLIC BLOOD PRESSURE: 153 MMHG | HEART RATE: 76 BPM | HEIGHT: 62 IN | DIASTOLIC BLOOD PRESSURE: 75 MMHG | BODY MASS INDEX: 30.91 KG/M2 | OXYGEN SATURATION: 98 % | TEMPERATURE: 97 F | WEIGHT: 168 LBS | RESPIRATION RATE: 18 BRPM

## 2021-06-06 DIAGNOSIS — S99.912A INJURY OF LEFT ANKLE, INITIAL ENCOUNTER: Primary | ICD-10-CM

## 2021-06-06 DIAGNOSIS — S99.912A INJURY OF LEFT ANKLE, INITIAL ENCOUNTER: ICD-10-CM

## 2021-06-06 PROCEDURE — 73610 X-RAY EXAM OF ANKLE: CPT

## 2021-06-06 PROCEDURE — G0382 LEV 3 HOSP TYPE B ED VISIT: HCPCS | Performed by: NURSE PRACTITIONER

## 2021-06-06 RX ORDER — METOPROLOL SUCCINATE 25 MG/1
25 TABLET, EXTENDED RELEASE ORAL DAILY
COMMUNITY
Start: 2020-10-23 | End: 2021-10-23

## 2021-06-06 RX ORDER — DULOXETIN HYDROCHLORIDE 60 MG/1
60 CAPSULE, DELAYED RELEASE ORAL DAILY
COMMUNITY
Start: 2021-04-30

## 2021-06-06 NOTE — PATIENT INSTRUCTIONS
Rest, ice, elevate  Wear ace wrap as directed  Tylenol as needed for pain  If you develop any increased pain, swelling, numbness, tingling, or any new or concerning symptoms please return or proceed to ER  Follow up with pcp in 3-5 days  Ankle Sprain   WHAT YOU NEED TO KNOW:   An ankle sprain happens when 1 or more ligaments in your ankle joint stretch or tear  Ligaments are tough tissues that connect bones  Ligaments support your joints and keep your bones in place  DISCHARGE INSTRUCTIONS:   Return to the emergency department if:   · You have severe pain in your ankle  · Your foot or toes are cold or numb  · Your ankle becomes more weak or unstable (wobbly)  · You are unable to put any weight on your ankle or foot  · Your swelling has increased or returned  Call your doctor if:   · Your pain does not go away, even after treatment  · You have questions or concerns about your condition or care  Medicines: You may need any of the following:  · NSAIDs , such as ibuprofen, help decrease swelling, pain, and fever  This medicine is available with or without a doctor's order  NSAIDs can cause stomach bleeding or kidney problems in certain people  If you take blood thinner medicine, always ask your healthcare provider if NSAIDs are safe for you  Always read the medicine label and follow directions  · Acetaminophen  decreases pain and fever  It is available without a doctor's order  Ask how much to take and how often to take it  Follow directions  Read the labels of all other medicines you are using to see if they also contain acetaminophen, or ask your doctor or pharmacist  Acetaminophen can cause liver damage if not taken correctly  Do not use more than 4 grams (4,000 milligrams) total of acetaminophen in one day  · Prescription pain medicine  may be given  Ask your healthcare provider how to take this medicine safely  Some prescription pain medicines contain acetaminophen   Do not take other medicines that contain acetaminophen without talking to your healthcare provider  Too much acetaminophen may cause liver damage  Prescription pain medicine may cause constipation  Ask your healthcare provider how to prevent or treat constipation  · Take your medicine as directed  Contact your healthcare provider if you think your medicine is not helping or if you have side effects  Tell him or her if you are allergic to any medicine  Keep a list of the medicines, vitamins, and herbs you take  Include the amounts, and when and why you take them  Bring the list or the pill bottles to follow-up visits  Carry your medicine list with you in case of an emergency  Self-care:   · Use support devices,  such as a brace, cast, or splint, to limit your movement and protect your joint  You may need to use crutches to decrease your pain as you move around  · Go to physical therapy as directed  A physical therapist teaches you exercises to help improve movement and strength, and to decrease pain  · Rest  your ankle so that it can heal  Return to normal activities as directed  · Apply ice  on your ankle for 15 to 20 minutes every hour or as directed  Use an ice pack, or put crushed ice in a plastic bag  Cover it with a towel  Ice helps prevent tissue damage and decreases swelling and pain  · Compress  your ankle  Ask if you should wrap an elastic bandage around your injured ligament  An elastic bandage provides support and helps decrease swelling and movement so your joint can heal  Wear as long as directed  · Elevate  your ankle above the level of your heart as often as you can  This will help decrease swelling and pain  Prop your ankle on pillows or blankets to keep it elevated comfortably  Prevent another ankle sprain:   · Let your ankle heal   Find out how long your ligament needs to heal  Do not do any physical activity until your healthcare provider says it is okay   If you start activity too soon, you may develop a more serious injury  · Always warm up and stretch  before you exercise or play sports  · Use the right equipment  Always wear shoes that fit well and are made for the activity that you are doing  You may also need ankle supports, elbow and knee pads, or braces  Follow up with your doctor as directed:  Write down your questions so you remember to ask them during your visits  © Copyright [address] Information is for End User's use only and may not be sold, redistributed or otherwise used for commercial purposes  All illustrations and images included in CareNotes® are the copyrighted property of [employer]  or [address] [place], [place] [address]   The above information is an  only  It is not intended as medical advice for individual conditions or treatments  Talk to your doctor, nurse or pharmacist before following any medical regimen to see if it is safe and effective for you

## 2021-06-06 NOTE — LETTER
June 6, 2021     Patient: Joey Martin   YOB: 1960   Date of Visit: 6/6/2021       To Whom it May Concern:    Joey Martin was seen in my clinic on 6/6/2021  She may return to work on 6/8/2021  If you have any questions or concerns, please don't hesitate to call           Sincerely,          TEENA Scott        CC: No Recipients

## 2021-06-06 NOTE — PROGRESS NOTES
[facility]        NAME: Daniel Arauz is a 62 y o  female  : 1960    MRN: [id number]  DATE: 2021  TIME: 11:03 AM    Assessment and Plan   Injury of left ankle, initial encounter [S99 912A]  1  Injury of left ankle, initial encounter  XR ankle 3+ vw left      no acute osseous abnormality per my read  Ace wrap and a walker supplied  Patient Instructions     Patient Instructions     Rest, ice, elevate  Wear ace wrap as directed  Tylenol as needed for pain  If you develop any increased pain, swelling, numbness, tingling, or any new or concerning symptoms please return or proceed to ER  Follow up with pcp in 3-5 days  Ankle Sprain   WHAT YOU NEED TO KNOW:   An ankle sprain happens when 1 or more ligaments in your ankle joint stretch or tear  Ligaments are tough tissues that connect bones  Ligaments support your joints and keep your bones in place  DISCHARGE INSTRUCTIONS:   Return to the emergency department if:   · You have severe pain in your ankle  · Your foot or toes are cold or numb  · Your ankle becomes more weak or unstable (wobbly)  · You are unable to put any weight on your ankle or foot  · Your swelling has increased or returned  Call your doctor if:   · Your pain does not go away, even after treatment  · You have questions or concerns about your condition or care  Medicines: You may need any of the following:  · NSAIDs , such as ibuprofen, help decrease swelling, pain, and fever  This medicine is available with or without a doctor's order  NSAIDs can cause stomach bleeding or kidney problems in certain people  If you take blood thinner medicine, always ask your healthcare provider if NSAIDs are safe for you  Always read the medicine label and follow directions  · Acetaminophen  decreases pain and fever  It is available without a doctor's order  Ask how much to take and how often to take it  Follow directions   Read the labels of all other medicines you are using to see if they also contain acetaminophen, or ask your doctor or pharmacist  Acetaminophen can cause liver damage if not taken correctly  Do not use more than 4 grams (4,000 milligrams) total of acetaminophen in one day  · Prescription pain medicine  may be given  Ask your healthcare provider how to take this medicine safely  Some prescription pain medicines contain acetaminophen  Do not take other medicines that contain acetaminophen without talking to your healthcare provider  Too much acetaminophen may cause liver damage  Prescription pain medicine may cause constipation  Ask your healthcare provider how to prevent or treat constipation  · Take your medicine as directed  Contact your healthcare provider if you think your medicine is not helping or if you have side effects  Tell him or her if you are allergic to any medicine  Keep a list of the medicines, vitamins, and herbs you take  Include the amounts, and when and why you take them  Bring the list or the pill bottles to follow-up visits  Carry your medicine list with you in case of an emergency  Self-care:   · Use support devices,  such as a brace, cast, or splint, to limit your movement and protect your joint  You may need to use crutches to decrease your pain as you move around  · Go to physical therapy as directed  A physical therapist teaches you exercises to help improve movement and strength, and to decrease pain  · Rest  your ankle so that it can heal  Return to normal activities as directed  · Apply ice  on your ankle for 15 to 20 minutes every hour or as directed  Use an ice pack, or put crushed ice in a plastic bag  Cover it with a towel  Ice helps prevent tissue damage and decreases swelling and pain  · Compress  your ankle  Ask if you should wrap an elastic bandage around your injured ligament   An elastic bandage provides support and helps decrease swelling and movement so your joint can heal  Wear as long as directed  · Elevate  your ankle above the level of your heart as often as you can  This will help decrease swelling and pain  Prop your ankle on pillows or blankets to keep it elevated comfortably  Prevent another ankle sprain:   · Let your ankle heal   Find out how long your ligament needs to heal  Do not do any physical activity until your healthcare provider says it is okay  If you start activity too soon, you may develop a more serious injury  · Always warm up and stretch  before you exercise or play sports  · Use the right equipment  Always wear shoes that fit well and are made for the activity that you are doing  You may also need ankle supports, elbow and knee pads, or braces  Follow up with your doctor as directed:  Write down your questions so you remember to ask them during your visits  © Copyright [address] Information is for End User's use only and may not be sold, redistributed or otherwise used for commercial purposes  All illustrations and images included in CareNotes® are the copyrighted property of [employer]  or [address] [place], [place] [address]   The above information is an  only  It is not intended as medical advice for individual conditions or treatments  Talk to your doctor, nurse or pharmacist before following any medical regimen to see if it is safe and effective for you  Follow up with PCP in 3-5 days  Proceed to  ER if symptoms worsen  Chief Complaint     Chief Complaint   Patient presents with    Ankle Injury     Patient c/o left ankle pain after twisting ankle at work 3 days ago  History of Present Illness         Patient is a 62-year-old female who presents with a 3 day history of left ankle pain and swelling  Patient states that she sprained her ankle while playing with her grandchildren  States she was running when she twisted her ankle, did not fall to the ground  Denies any previous injury trauma    Complaining of pain with ambulation  Has been taking Tylenol with no relief  Denies any numbness, tingling, weakness of extremity  Review of Systems   Review of Systems   Constitutional: Negative for chills, diaphoresis, fatigue and fever  Respiratory: Negative  Cardiovascular: Negative  Musculoskeletal: Positive for arthralgias and joint swelling  Negative for back pain, gait problem, myalgias, neck pain and neck stiffness  Skin: Negative  Neurological: Negative  Negative for weakness and numbness           Current Medications       Current Outpatient Medications:     DULoxetine (CYMBALTA) 60 mg delayed release capsule, Take 60 mg by mouth daily, Disp: , Rfl:     gabapentin (NEURONTIN) 600 MG tablet, Take 600 mg by mouth 3 (three) times a day  , Disp: , Rfl:     levothyroxine 25 mcg tablet, Take 25 mcg by mouth daily, Disp: , Rfl: 0    metoprolol succinate (TOPROL-XL) 25 mg 24 hr tablet, Take 25 mg by mouth daily, Disp: , Rfl:     acetaminophen-codeine (TYLENOL #3) 300-30 mg per tablet, Take 1 tablet by mouth every 4 (four) hours as needed for moderate pain, Disp: , Rfl:     atenolol (TENORMIN) 25 mg tablet, take 1 tablet by mouth once daily, Disp: , Rfl:     dexamethasone (DECADRON) 2 mg tablet, 1 at onset of a severe headache (Patient not taking: Reported on 11/19/2019), Disp: 5 tablet, Rfl: 0    ergocalciferol (VITAMIN D2) 50,000 units, Once a week for 12 weeks (Patient not taking: Reported on 11/19/2019), Disp: 12 capsule, Rfl: 0    famotidine (PEPCID) 10 mg tablet, Take 10 mg by mouth 2 (two) times a day, Disp: , Rfl:     metoclopramide (REGLAN) 10 mg tablet, take 1 tablet by mouth every 8 hours if needed nausea and vomiting, Disp: , Rfl: 0    OLANZapine (ZyPREXA) 5 mg tablet, 1 at bedtime daily (Patient not taking: Reported on 11/19/2019), Disp: 30 tablet, Rfl: 3    ondansetron (ZOFRAN-ODT) 4 mg disintegrating tablet, Take 4 mg by mouth every 6 (six) hours as needed for nausea or vomiting, Disp: , Rfl:   prochlorperazine (COMPAZINE) 10 mg tablet, 1 tab at onset of migraine, can repeat in 8 hours, can take with triptan/NSAID (Patient not taking: Reported on 11/19/2019), Disp: 15 tablet, Rfl: 0    tiZANidine (ZANAFLEX) 2 mg tablet, 2 mg 3 times a day p r n  (Patient not taking: Reported on 6/6/2021), Disp: 90 tablet, Rfl: 3    traMADol (ULTRAM) 50 mg tablet, Take 1 tablet (50 mg total) by mouth every 8 (eight) hours as needed for moderate pain (Patient not taking: Reported on 6/6/2021), Disp: 6 tablet, Rfl: 0    venlafaxine (EFFEXOR-XR) 75 mg 24 hr capsule, Take 1 capsule (75 mg total) by mouth daily (Patient not taking: Reported on 6/6/2021), Disp: 90 capsule, Rfl: 0    Current Allergies     Allergies as of 06/06/2021 - Reviewed 06/06/2021   Allergen Reaction Noted    Ketorolac tromethamine Hives 09/27/2013    Celecoxib      Penicillins Swelling     Codeine Anxiety and Other (See Comments)     Hydrocodone Nausea Only and Other (See Comments) 05/30/2018    Pregabalin Other (See Comments)             The following portions of the patient's history were reviewed and updated as appropriate: allergies, current medications, past family history, past medical history, past social history, past surgical history and problem list      Past Medical History:   Diagnosis Date    Anxiety     Depression     Disease of thyroid gland     Hypertension     Migraine        Past Surgical History:   Procedure Laterality Date    HYSTERECTOMY      partial       History reviewed  No pertinent family history  Medications have been verified  Objective   /75   Pulse 76   Temp (!) 97 °F (36 1 °C) (Temporal)   Resp 18   Ht 5' 2" (1 575 m)   Wt 76 2 kg (168 lb)   SpO2 98%   BMI 30 73 kg/m²   No LMP recorded  Patient is postmenopausal        Physical Exam     Physical Exam  Constitutional:       General: She is not in acute distress  Appearance: Normal appearance  She is not diaphoretic     HENT: Head: Normocephalic and atraumatic  Cardiovascular:      Rate and Rhythm: Normal rate and regular rhythm  Pulses: Normal pulses  Dorsalis pedis pulses are 2+ on the right side and 2+ on the left side  Heart sounds: Normal heart sounds, S1 normal and S2 normal    Pulmonary:      Effort: Pulmonary effort is normal       Breath sounds: Normal breath sounds and air entry  Musculoskeletal:      Left ankle: She exhibits decreased range of motion and swelling  She exhibits no ecchymosis, no deformity, no laceration and normal pulse  Tenderness  Lateral malleolus tenderness found  No medial malleolus, no AITFL, no CF ligament, no posterior TFL, no head of 5th metatarsal and no proximal fibula tenderness found  Neurological:      Mental Status: She is alert and oriented to person, place, and time
